# Patient Record
Sex: MALE | Race: WHITE | NOT HISPANIC OR LATINO | URBAN - METROPOLITAN AREA
[De-identification: names, ages, dates, MRNs, and addresses within clinical notes are randomized per-mention and may not be internally consistent; named-entity substitution may affect disease eponyms.]

---

## 2024-01-08 ENCOUNTER — APPOINTMENT (OUTPATIENT)
Dept: URBAN - METROPOLITAN AREA CLINIC 305 | Age: 64
Setting detail: DERMATOLOGY
End: 2024-01-08

## 2024-01-08 DIAGNOSIS — L11.1 TRANSIENT ACANTHOLYTIC DERMATOSIS [GROVER]: ICD-10-CM

## 2024-01-08 DIAGNOSIS — Z71.89 OTHER SPECIFIED COUNSELING: ICD-10-CM

## 2024-01-08 PROCEDURE — OTHER TREATMENT REGIMEN: OTHER

## 2024-01-08 PROCEDURE — OTHER PRESCRIPTION: OTHER

## 2024-01-08 PROCEDURE — OTHER COUNSELING: OTHER

## 2024-01-08 PROCEDURE — OTHER MIPS QUALITY: OTHER

## 2024-01-08 PROCEDURE — 99203 OFFICE O/P NEW LOW 30 MIN: CPT

## 2024-01-08 RX ORDER — DOXYCYCLINE HYCLATE 100 MG/1
CAPSULE, GELATIN COATED ORAL
Qty: 30 | Refills: 2 | Status: ERX | COMMUNITY
Start: 2024-01-08

## 2024-01-08 RX ORDER — CLOBETASOL PROPIONATE 0.5 MG/G
CREAM TOPICAL BID
Qty: 60 | Refills: 2 | Status: ERX | COMMUNITY
Start: 2024-01-08

## 2024-01-08 NOTE — PROCEDURE: TREATMENT REGIMEN
Plan: If not significantly improved, will consider biopsy at follow up
Detail Level: Zone
Plan: Patient with personal hx of melanoma. Highly advised patient that he needs follow up every 3 months

## 2024-01-08 NOTE — PROCEDURE: MIPS QUALITY
Quality 431: Preventive Care And Screening: Unhealthy Alcohol Use - Screening: Patient not identified as an unhealthy alcohol user when screened for unhealthy alcohol use using a systematic screening method
Quality 110: Preventive Care And Screening: Influenza Immunization: Influenza immunization was not ordered or administered, reason not given
Quality 47: Advance Care Plan: Advance Care Planning discussed and documented; advance care plan or surrogate decision maker documented in the medical record.
Quality 226: Preventive Care And Screening: Tobacco Use: Screening And Cessation Intervention: Patient screened for tobacco use and is an ex/non-smoker
Quality 130: Documentation Of Current Medications In The Medical Record: Current Medications Documented
Detail Level: Detailed
Quality 111:Pneumonia Vaccination Status For Older Adults: Patient received any pneumococcal conjugate or polysaccharide vaccine on or after their 60th birthday and before the end of the measurement period